# Patient Record
Sex: MALE | Race: WHITE | URBAN - METROPOLITAN AREA
[De-identification: names, ages, dates, MRNs, and addresses within clinical notes are randomized per-mention and may not be internally consistent; named-entity substitution may affect disease eponyms.]

---

## 2022-09-22 ENCOUNTER — OFFICE VISIT (OUTPATIENT)
Dept: URBAN - METROPOLITAN AREA CLINIC 28 | Facility: CLINIC | Age: 67
End: 2022-09-22
Payer: MEDICARE

## 2022-09-22 DIAGNOSIS — H43.811 VITREOUS DETACHMENT OF RIGHT EYE: Primary | ICD-10-CM

## 2022-09-22 DIAGNOSIS — D31.32 BENIGN NEOPLASM OF LEFT CHOROID: ICD-10-CM

## 2022-09-22 DIAGNOSIS — H25.813 COMBINED FORMS OF AGE-RELATED CATARACT, BILATERAL: ICD-10-CM

## 2022-09-22 PROCEDURE — 92004 COMPRE OPH EXAM NEW PT 1/>: CPT | Performed by: OPTOMETRIST

## 2022-09-22 ASSESSMENT — INTRAOCULAR PRESSURE
OD: 14
OS: 14

## 2022-09-22 ASSESSMENT — KERATOMETRY
OS: 43.50
OD: 43.50

## 2022-09-22 NOTE — IMPRESSION/PLAN
Impression: Benign neoplasm of left choroid: D31.32. Plan: Educated on exam findings. Baseline photos today. Recommend annual monitoring.

## 2023-02-21 LAB
CREATININE (MG/DL) IN SER/PLAS: 1 MG/DL (ref 0.5–1.3)
GFR MALE: 82 ML/MIN/1.73M2

## 2023-03-11 LAB
CALCULI COMPOSITION: NORMAL
CALCULI DESCRIPTION: NORMAL
CALCULI MASS: 554 MG

## 2023-04-20 DIAGNOSIS — N20.0 KIDNEY STONES: ICD-10-CM

## 2023-04-20 DIAGNOSIS — R31.9 HEMATURIA, UNSPECIFIED TYPE: ICD-10-CM

## 2023-04-20 DIAGNOSIS — R10.9 FLANK PAIN: Primary | ICD-10-CM

## 2023-04-20 PROBLEM — E66.812 OBESITY, CLASS II, BMI 35-39.9: Status: ACTIVE | Noted: 2023-04-20

## 2023-04-20 PROBLEM — E66.9 OBESITY, CLASS II, BMI 35-39.9: Status: ACTIVE | Noted: 2023-04-20

## 2023-04-20 PROBLEM — E78.5 DYSLIPIDEMIA: Status: ACTIVE | Noted: 2023-04-20

## 2023-04-20 PROBLEM — R03.0 PRE-HYPERTENSION: Status: ACTIVE | Noted: 2023-04-20

## 2023-04-20 PROBLEM — R97.20 ELEVATED PSA: Status: ACTIVE | Noted: 2023-04-20

## 2023-04-20 RX ORDER — BRIMONIDINE TARTRATE AND TIMOLOL MALEATE 2; 5 MG/ML; MG/ML
1 SOLUTION OPHTHALMIC EVERY 12 HOURS SCHEDULED
COMMUNITY
Start: 2017-09-11

## 2023-04-20 RX ORDER — DICLOFENAC SODIUM 1 MG/ML
SOLUTION/ DROPS OPHTHALMIC
COMMUNITY
Start: 2023-02-09 | End: 2023-05-01 | Stop reason: ALTCHOICE

## 2023-04-21 ENCOUNTER — LAB (OUTPATIENT)
Dept: LAB | Facility: LAB | Age: 68
End: 2023-04-21
Payer: MEDICARE

## 2023-04-21 DIAGNOSIS — R10.9 FLANK PAIN: ICD-10-CM

## 2023-04-21 LAB
APPEARANCE, URINE: ABNORMAL
BILIRUBIN, URINE: NEGATIVE
BLOOD, URINE: ABNORMAL
COLOR, URINE: YELLOW
GLUCOSE, URINE: NEGATIVE MG/DL
KETONES, URINE: NEGATIVE MG/DL
LEUKOCYTE ESTERASE, URINE: NEGATIVE
NITRITE, URINE: NEGATIVE
PH, URINE: 5 (ref 5–8)
PROTEIN, URINE: NEGATIVE MG/DL
RBC, URINE: NORMAL /HPF (ref 0–5)
SPECIFIC GRAVITY, URINE: 1.02 (ref 1–1.03)
UROBILINOGEN, URINE: <2 MG/DL (ref 0–1.9)
WBC, URINE: NORMAL /HPF (ref 0–5)

## 2023-04-21 PROCEDURE — 81001 URINALYSIS AUTO W/SCOPE: CPT

## 2023-04-25 ENCOUNTER — LAB (OUTPATIENT)
Dept: LAB | Facility: LAB | Age: 68
End: 2023-04-25
Payer: MEDICARE

## 2023-04-25 DIAGNOSIS — R31.9 HEMATURIA, UNSPECIFIED TYPE: ICD-10-CM

## 2023-04-25 DIAGNOSIS — N20.0 KIDNEY STONES: ICD-10-CM

## 2023-04-25 DIAGNOSIS — R10.9 FLANK PAIN: ICD-10-CM

## 2023-04-25 PROCEDURE — 36415 COLL VENOUS BLD VENIPUNCTURE: CPT

## 2023-04-25 PROCEDURE — 82565 ASSAY OF CREATININE: CPT

## 2023-04-26 ENCOUNTER — OFFICE VISIT (OUTPATIENT)
Dept: PRIMARY CARE | Facility: CLINIC | Age: 68
End: 2023-04-26
Payer: MEDICARE

## 2023-04-26 VITALS
SYSTOLIC BLOOD PRESSURE: 137 MMHG | HEART RATE: 54 BPM | WEIGHT: 272.3 LBS | OXYGEN SATURATION: 96 % | RESPIRATION RATE: 12 BRPM | BODY MASS INDEX: 34.49 KG/M2 | DIASTOLIC BLOOD PRESSURE: 81 MMHG | TEMPERATURE: 98.7 F

## 2023-04-26 DIAGNOSIS — E78.2 MIXED HYPERLIPIDEMIA: ICD-10-CM

## 2023-04-26 DIAGNOSIS — E66.9 OBESITY, CLASS II, BMI 35-39.9: ICD-10-CM

## 2023-04-26 DIAGNOSIS — R97.20 ELEVATED PSA: ICD-10-CM

## 2023-04-26 DIAGNOSIS — N40.0 BENIGN PROSTATIC HYPERPLASIA, UNSPECIFIED WHETHER LOWER URINARY TRACT SYMPTOMS PRESENT: ICD-10-CM

## 2023-04-26 DIAGNOSIS — E78.5 DYSLIPIDEMIA: ICD-10-CM

## 2023-04-26 DIAGNOSIS — N20.0 KIDNEY STONES: Primary | ICD-10-CM

## 2023-04-26 DIAGNOSIS — R31.29 OTHER MICROSCOPIC HEMATURIA: ICD-10-CM

## 2023-04-26 PROBLEM — R03.0 PRE-HYPERTENSION: Status: RESOLVED | Noted: 2023-04-20 | Resolved: 2023-04-26

## 2023-04-26 LAB
CREATININE (MG/DL) IN SER/PLAS: 0.94 MG/DL (ref 0.5–1.3)
GFR MALE: 88 ML/MIN/1.73M2

## 2023-04-26 PROCEDURE — 1036F TOBACCO NON-USER: CPT | Performed by: FAMILY MEDICINE

## 2023-04-26 PROCEDURE — 1160F RVW MEDS BY RX/DR IN RCRD: CPT | Performed by: FAMILY MEDICINE

## 2023-04-26 PROCEDURE — 99214 OFFICE O/P EST MOD 30 MIN: CPT | Performed by: FAMILY MEDICINE

## 2023-04-26 PROCEDURE — 1159F MED LIST DOCD IN RCRD: CPT | Performed by: FAMILY MEDICINE

## 2023-04-26 NOTE — ASSESSMENT & PLAN NOTE
CT scan 4/25/23 showed 1-2 mm calculus in left posterolateral bladder.  Explains the recent finding of hematuria.  No treatment needed now that it is passed into the bladder.

## 2023-04-26 NOTE — ASSESSMENT & PLAN NOTE
Recent CT calcium scoring test showed low plaque build up in arteries.  This indicates low risk for heart attack or stroke, so medication treatment is not recommended at this time.  Will continue to monitor cholesterol levels.   Ordered lipid panel prior to next appt.  Continue lifestyle measures to control cholesterol.

## 2023-04-26 NOTE — ASSESSMENT & PLAN NOTE
Body mass index is 34.49 kg/m².  Ideal weight is BMI 25 or under.  Think of healthy lifestyle changes rather than a diet  Weight loss is 75% diet and 25% exercise   Think of daily plate that includes 50% vegetables and do not count peas, corn, white potatoes as a vegetable. 25% complex grains/starch, 25% protein/fat.  Ideal diet is predominately plant based - Vegetables and Fruit. Protein from non meat sources ideal (whole beans, chickpeas). If choosing meat source for protein - first choice is fish in omega-3 such as [place]. Next choice is skinless poultry and last choice and infrequent should be red meat.  Weight loss can be helped through mindful eating. There are apps that can be used to assist with keeping track of your food water and exercise, such as My fitness pal Can see nutritionist if preferred.   Losing 4-6 lbs a month is a sufficient means of gradually losing and maintaining weight loss (good healthy, long term weight loss).

## 2023-04-26 NOTE — ASSESSMENT & PLAN NOTE
Seen by urologist Dr. Hernandez.  Determined to be due to benign aging prostate.  Ordered repeat PSA to be completed before next appt.

## 2023-04-26 NOTE — PROGRESS NOTES
Subjective     Patient ID: Denis Robertson is a 68 y.o. male who presents for follow up of chronic medical conditions.    Tdap:  Colonoscopy: 03/26/2018      HPI  Recent CT scan from yesterday showed small 1-2 mm calculus in left posterolateral bladder. He still has left flank pain intermittently. It was severe 2 days ago when he woke up, but has improved since then.     Patient reports that urine flow has improved since starting Prostagenix. Bladder emptying test per Dr. Hernandez was normal.    Patient reports that he checked home BP and it was initially reading 169/109. After rechecking it, the reading went down to 139/84. Patient has been swimming regularly for exercise.    Review of Systems  constitutional: as per HPI  eyes:as per HPI  CV:as per HPI  Respiratory:as per HPI  GI:as per HPI  neuro:as per HPI  endo:as per HPI  heme/lymph:as per HPI     Objective   /81 (BP Location: Right arm, Patient Position: Sitting, BP Cuff Size: Large adult)   Pulse 54   Temp 37.1 °C (98.7 °F) (Temporal)   Resp 12   Wt 124 kg (272 lb 4.8 oz)   SpO2 96%   BMI 34.49 kg/m²   Physical Exam  Constitutional:       Appearance: Normal appearance. He is obese.   Cardiovascular:      Rate and Rhythm: Normal rate and regular rhythm.   Pulmonary:      Effort: Pulmonary effort is normal.      Breath sounds: Normal breath sounds.         Assessment/Plan   Problem List Items Addressed This Visit          Genitourinary    Elevated PSA     Seen by urologist Dr. Hernandez.  Determined to be due to benign aging prostate.  Ordered repeat PSA to be completed before next appt.         Other microscopic hematuria     Secondary to kidney stone that has passed into the left side of the bladder.         Kidney stones - Primary     CT scan 4/25/23 showed 1-2 mm calculus in left posterolateral bladder.  Explains the recent finding of hematuria.  No treatment needed now that it is passed into the bladder.            Endocrine/Metabolic    Obesity, Class II,  BMI 35-39.9     Body mass index is 34.49 kg/m².  Ideal weight is BMI 25 or under.  Think of healthy lifestyle changes rather than a diet  Weight loss is 75% diet and 25% exercise   Think of daily plate that includes 50% vegetables and do not count peas, corn, white potatoes as a vegetable. 25% complex grains/starch, 25% protein/fat.  Ideal diet is predominately plant based - Vegetables and Fruit. Protein from non meat sources ideal (whole beans, chickpeas). If choosing meat source for protein - first choice is fish in omega-3 such as [place]. Next choice is skinless poultry and last choice and infrequent should be red meat.  Weight loss can be helped through mindful eating. There are apps that can be used to assist with keeping track of your food water and exercise, such as My fitness pal Can see nutritionist if preferred.   Losing 4-6 lbs a month is a sufficient means of gradually losing and maintaining weight loss (good healthy, long term weight loss).             Other    Dyslipidemia     Recent CT calcium scoring test showed low plaque build up in arteries.  This indicates low risk for heart attack or stroke, so medication treatment is not recommended at this time.  Will continue to monitor cholesterol levels.   Ordered lipid panel prior to next appt.  Continue lifestyle measures to control cholesterol.          Continue lifestyle measures to control blood pressure.   Encouraged to regularly check blood pressure at home.  Home blood pressure readings should be taken after seated for 5 minutes, feet on the floor, back supported, arm elevated at heart level, and with an empty bladder    Follow up in 6 months.     By signing my name below I, siria Smiley, attest that this documentation has been prepared under the direction and in the presence of Dr. Idris Olivia. 04/26/23

## 2023-10-25 ENCOUNTER — LAB (OUTPATIENT)
Dept: LAB | Facility: LAB | Age: 68
End: 2023-10-25
Payer: MEDICARE

## 2023-10-25 DIAGNOSIS — N20.0 KIDNEY STONES: ICD-10-CM

## 2023-10-25 DIAGNOSIS — N40.0 BENIGN PROSTATIC HYPERPLASIA, UNSPECIFIED WHETHER LOWER URINARY TRACT SYMPTOMS PRESENT: ICD-10-CM

## 2023-10-25 DIAGNOSIS — E78.2 MIXED HYPERLIPIDEMIA: ICD-10-CM

## 2023-10-25 LAB
ALBUMIN SERPL BCP-MCNC: 4.4 G/DL (ref 3.4–5)
ALP SERPL-CCNC: 92 U/L (ref 33–136)
ALT SERPL W P-5'-P-CCNC: 18 U/L (ref 10–52)
ANION GAP SERPL CALC-SCNC: 12 MMOL/L (ref 10–20)
AST SERPL W P-5'-P-CCNC: 16 U/L (ref 9–39)
BASOPHILS # BLD AUTO: 0.04 X10*3/UL (ref 0–0.1)
BASOPHILS NFR BLD AUTO: 0.6 %
BILIRUB SERPL-MCNC: 0.7 MG/DL (ref 0–1.2)
BUN SERPL-MCNC: 20 MG/DL (ref 6–23)
CALCIUM SERPL-MCNC: 9.6 MG/DL (ref 8.6–10.6)
CHLORIDE SERPL-SCNC: 103 MMOL/L (ref 98–107)
CHOLEST SERPL-MCNC: 198 MG/DL (ref 0–199)
CHOLESTEROL/HDL RATIO: 5.4
CO2 SERPL-SCNC: 28 MMOL/L (ref 21–32)
CREAT SERPL-MCNC: 1.04 MG/DL (ref 0.5–1.3)
EOSINOPHIL # BLD AUTO: 0.13 X10*3/UL (ref 0–0.7)
EOSINOPHIL NFR BLD AUTO: 2 %
ERYTHROCYTE [DISTWIDTH] IN BLOOD BY AUTOMATED COUNT: 12.9 % (ref 11.5–14.5)
GFR SERPL CREATININE-BSD FRML MDRD: 78 ML/MIN/1.73M*2
GLUCOSE SERPL-MCNC: 88 MG/DL (ref 74–99)
HCT VFR BLD AUTO: 47.9 % (ref 41–52)
HDLC SERPL-MCNC: 36.8 MG/DL
HGB BLD-MCNC: 15.9 G/DL (ref 13.5–17.5)
IMM GRANULOCYTES # BLD AUTO: 0.01 X10*3/UL (ref 0–0.7)
IMM GRANULOCYTES NFR BLD AUTO: 0.2 % (ref 0–0.9)
LDLC SERPL CALC-MCNC: 112 MG/DL
LYMPHOCYTES # BLD AUTO: 1.47 X10*3/UL (ref 1.2–4.8)
LYMPHOCYTES NFR BLD AUTO: 22.1 %
MCH RBC QN AUTO: 30.5 PG (ref 26–34)
MCHC RBC AUTO-ENTMCNC: 33.2 G/DL (ref 32–36)
MCV RBC AUTO: 92 FL (ref 80–100)
MONOCYTES # BLD AUTO: 0.64 X10*3/UL (ref 0.1–1)
MONOCYTES NFR BLD AUTO: 9.6 %
NEUTROPHILS # BLD AUTO: 4.37 X10*3/UL (ref 1.2–7.7)
NEUTROPHILS NFR BLD AUTO: 65.5 %
NON HDL CHOLESTEROL: 161 MG/DL (ref 0–149)
NRBC BLD-RTO: 0 /100 WBCS (ref 0–0)
PLATELET # BLD AUTO: 188 X10*3/UL (ref 150–450)
PMV BLD AUTO: 11.3 FL (ref 7.5–11.5)
POTASSIUM SERPL-SCNC: 4.2 MMOL/L (ref 3.5–5.3)
PROT SERPL-MCNC: 6.8 G/DL (ref 6.4–8.2)
PSA SERPL-MCNC: 6.77 NG/ML
RBC # BLD AUTO: 5.21 X10*6/UL (ref 4.5–5.9)
SODIUM SERPL-SCNC: 139 MMOL/L (ref 136–145)
TRIGL SERPL-MCNC: 245 MG/DL (ref 0–149)
URATE SERPL-MCNC: 7 MG/DL (ref 4–7.5)
VLDL: 49 MG/DL (ref 0–40)
WBC # BLD AUTO: 6.7 X10*3/UL (ref 4.4–11.3)

## 2023-10-25 PROCEDURE — 84550 ASSAY OF BLOOD/URIC ACID: CPT

## 2023-10-25 PROCEDURE — 80053 COMPREHEN METABOLIC PANEL: CPT

## 2023-10-25 PROCEDURE — 85025 COMPLETE CBC W/AUTO DIFF WBC: CPT

## 2023-10-25 PROCEDURE — 84153 ASSAY OF PSA TOTAL: CPT

## 2023-10-25 PROCEDURE — 36415 COLL VENOUS BLD VENIPUNCTURE: CPT

## 2023-10-25 PROCEDURE — 80061 LIPID PANEL: CPT

## 2023-11-01 ENCOUNTER — APPOINTMENT (OUTPATIENT)
Dept: PRIMARY CARE | Facility: CLINIC | Age: 68
End: 2023-11-01
Payer: MEDICARE

## 2023-12-13 ENCOUNTER — OFFICE VISIT (OUTPATIENT)
Dept: PRIMARY CARE | Facility: CLINIC | Age: 68
End: 2023-12-13
Payer: MEDICARE

## 2023-12-13 VITALS
HEART RATE: 56 BPM | HEIGHT: 75 IN | RESPIRATION RATE: 16 BRPM | TEMPERATURE: 97.7 F | SYSTOLIC BLOOD PRESSURE: 165 MMHG | OXYGEN SATURATION: 97 % | DIASTOLIC BLOOD PRESSURE: 83 MMHG | WEIGHT: 267.6 LBS | BODY MASS INDEX: 33.27 KG/M2

## 2023-12-13 DIAGNOSIS — R97.20 ELEVATED PSA: ICD-10-CM

## 2023-12-13 DIAGNOSIS — Z28.21 INFLUENZA VACCINE REFUSED: ICD-10-CM

## 2023-12-13 DIAGNOSIS — E66.9 OBESITY, CLASS II, BMI 35-39.9: ICD-10-CM

## 2023-12-13 DIAGNOSIS — Z13.89 SCREENING FOR MULTIPLE CONDITIONS: ICD-10-CM

## 2023-12-13 DIAGNOSIS — Z11.59 NEED FOR HEPATITIS C SCREENING TEST: ICD-10-CM

## 2023-12-13 DIAGNOSIS — Z28.21 PNEUMOCOCCAL VACCINE REFUSED: ICD-10-CM

## 2023-12-13 DIAGNOSIS — Z00.00 ROUTINE GENERAL MEDICAL EXAMINATION AT HEALTH CARE FACILITY: Primary | ICD-10-CM

## 2023-12-13 DIAGNOSIS — Z28.21 REFUSED PNEUMOCOCCAL VACCINATION: ICD-10-CM

## 2023-12-13 DIAGNOSIS — Z00.00 MEDICARE ANNUAL WELLNESS VISIT, SUBSEQUENT: ICD-10-CM

## 2023-12-13 PROCEDURE — 1160F RVW MEDS BY RX/DR IN RCRD: CPT | Performed by: FAMILY MEDICINE

## 2023-12-13 PROCEDURE — G0444 DEPRESSION SCREEN ANNUAL: HCPCS | Performed by: FAMILY MEDICINE

## 2023-12-13 PROCEDURE — G0439 PPPS, SUBSEQ VISIT: HCPCS | Performed by: FAMILY MEDICINE

## 2023-12-13 PROCEDURE — 1159F MED LIST DOCD IN RCRD: CPT | Performed by: FAMILY MEDICINE

## 2023-12-13 PROCEDURE — 1170F FXNL STATUS ASSESSED: CPT | Performed by: FAMILY MEDICINE

## 2023-12-13 PROCEDURE — 1036F TOBACCO NON-USER: CPT | Performed by: FAMILY MEDICINE

## 2023-12-13 ASSESSMENT — ENCOUNTER SYMPTOMS
UNEXPECTED WEIGHT CHANGE: 0
FREQUENCY: 0

## 2023-12-13 ASSESSMENT — PATIENT HEALTH QUESTIONNAIRE - PHQ9
1. LITTLE INTEREST OR PLEASURE IN DOING THINGS: NOT AT ALL
SUM OF ALL RESPONSES TO PHQ9 QUESTIONS 1 AND 2: 0
2. FEELING DOWN, DEPRESSED OR HOPELESS: NOT AT ALL

## 2023-12-13 ASSESSMENT — ACTIVITIES OF DAILY LIVING (ADL)
BATHING: INDEPENDENT
DRESSING: INDEPENDENT
GROCERY_SHOPPING: INDEPENDENT
TAKING_MEDICATION: INDEPENDENT
MANAGING_FINANCES: INDEPENDENT
DOING_HOUSEWORK: INDEPENDENT

## 2023-12-13 NOTE — ASSESSMENT & PLAN NOTE
Congratulations on your weight loss so far!  Body mass index is 33.9 kg/m².  Ideal weight is BMI 25 or under.  Think of healthy lifestyle changes rather than a diet  Weight loss is 75% diet and 25% exercise   Think of daily plate that includes 50% vegetables and do not count peas, corn, white potatoes as a vegetable. 25% complex grains/starch, 25% protein/fat.  Ideal diet is predominately plant based - Vegetables and Fruit. Protein from non meat sources ideal (whole beans, chickpeas). If choosing meat source for protein - first choice is fish in omega-3 such as [place]. Next choice is skinless poultry and last choice and infrequent should be red meat.  Weight loss can be helped through mindful eating. There are apps that can be used to assist with keeping track of your food water and exercise, such as My fitness pal Can see nutritionist if preferred.   Losing 4-6 lbs a month is a sufficient means of gradually losing and maintaining weight loss (good healthy, long term weight loss).

## 2023-12-13 NOTE — PROGRESS NOTES
"Subjective   Reason for Visit: Trevor Robertson is an 68 y.o. male here for a Medicare Wellness visit.               He has been feeling well overall. He has lost about 10 pounds recently and is happy with this progress. He does not check his blood pressure at home, mainly because he does not trust the equipment that he has.  He had an MRI in February of 2023. It shows no cancer. On 2/24/2023 his PSI was 8.1. He still does get up about once a night to use the bathroom. He is able to hold his bladder during the day, no leakage or accidents. He is taking Prostagenix since about February 2023.   His last colonoscopy was in 2021, he is not due to repeat until 2026. He had one benign polyp.   He declines the flu and pneumonia vaccines.        Patient Care Team:  Idris Olivia MD as PCP - General  Idris Olivia MD as PCP - [facility] Attributed Provider     Review of Systems   Constitutional:  Negative for unexpected weight change (Working on weight loss).   Genitourinary:  Negative for frequency and urgency.       Objective   Vitals:  /83 (BP Location: Left arm, Patient Position: Sitting, BP Cuff Size: Large adult)   Pulse 56   Temp 36.5 °C (97.7 °F)   Resp 16   Ht 1.892 m (6' 2.5\")   Wt 121 kg (267 lb 9.6 oz)   SpO2 97%   BMI 33.90 kg/m²         Physical Exam  Constitutional:       Appearance: Normal appearance. He is obese.   Cardiovascular:      Rate and Rhythm: Normal rate and regular rhythm.      Pulses: Normal pulses.      Heart sounds: Normal heart sounds.   Pulmonary:      Effort: Pulmonary effort is normal.      Breath sounds: Normal breath sounds.   Neurological:      General: No focal deficit present.      Mental Status: He is alert and oriented to person, place, and time. Mental status is at baseline.   Psychiatric:         Mood and Affect: Mood normal.         Behavior: Behavior normal.         Thought Content: Thought content normal.         Judgment: Judgment normal.         Assessment/Plan " "  Problem List Items Addressed This Visit       Elevated PSA    Current Assessment & Plan     Seen by urologist Dr. Hernandez.  2/24/2023 PSI 8.1         Obesity, Class II, BMI 35-39.9    Current Assessment & Plan     Congratulations on your weight loss so far!  Body mass index is 33.9 kg/m².  Ideal weight is BMI 25 or under.  Think of healthy lifestyle changes rather than a diet  Weight loss is 75% diet and 25% exercise   Think of daily plate that includes 50% vegetables and do not count peas, corn, white potatoes as a vegetable. 25% complex grains/starch, 25% protein/fat.  Ideal diet is predominately plant based - Vegetables and Fruit. Protein from non meat sources ideal (whole beans, chickpeas). If choosing meat source for protein - first choice is fish in omega-3 such as [place]. Next choice is skinless poultry and last choice and infrequent should be red meat.  Weight loss can be helped through mindful eating. There are apps that can be used to assist with keeping track of your food water and exercise, such as My fitness pal Can see nutritionist if preferred.   Losing 4-6 lbs a month is a sufficient means of gradually losing and maintaining weight loss (good healthy, long term weight loss).          RESOLVED: Refused pneumococcal vaccination    Medicare annual wellness visit, subsequent    Current Assessment & Plan     Tips for your general wellness...;  Remember importance of daily aerobic exercise for 30minutes to help with both your physical and mental/emotional health.  ;  Take time twice a day to relax with focused breathing and relaxation.  A good source to learn \"mindfulness\" relaxation is a book \"Mindfulness for Beginners\" by Robin Velazquez.  ;    Strive for healthy eating with plenty of water, fruits, vegetables, and choosing as much plant based diet as able  If do consume non plant protein, choose fish high in omega (like salmon or cod), skinless poultry, and rarely anything from a cow  Avoid processed " foods.  ;  Shop the perimeter of the grocery store  - not the inner aisles where all the boxed, bagged, and canned process non natural foods are   Avoid sugar - including fruit juices with added sugar and avoid artificial sweeteners (sucralose, aspartame).; if want to use sweetener, use stevia (natural plant based non caloric sweetener)  Recommended guided nutrition plans include Mediterranean Diet - online resources available     Get plenty of sleep nightly - 7 hours minimum;    Exercise 150min per week;    Do not use tobacco    Abstain or limit alcohol to 1-2 drinks per 24 hours    See your dentist at least yearly    Have an eye check at least every 5 years    Follow up 1 year for annual wellness checkup;          Influenza vaccine refused    Pneumococcal vaccine refused     Other Visit Diagnoses       Routine general medical examination at health care facility    -  Primary    Screening for multiple conditions        Need for hepatitis C screening test        Relevant Orders    Hepatitis C antibody             Scribe Attestation  By signing my name below, IKathleen , Mariano   attest that this documentation has been prepared under the direction and in the presence of Idris Olivia MD.

## 2023-12-13 NOTE — PROGRESS NOTES
Subjective   Patient ID: Trevor Robertson is a 68 year old male here for a Medicare Wellness visit.    HPI    Review of Systems    Objective   Vitals:    12/13/23 1454   BP: 165/83   Pulse: 56   Resp: 16   Temp: 36.5 °C (97.7 °F)   SpO2: 97%         Physical Exam      Problem List Items Addressed This Visit          Health Encounters    Medicare annual wellness visit, subsequent     Other Visit Diagnoses       Routine general medical examination at health care facility    -  Primary    Screening for multiple conditions        Need for hepatitis C screening test        Relevant Orders    Hepatitis C antibody          Scribe Attestation  By signing my name below, IKathleen Scribe   attest that this documentation has been prepared under the direction and in the presence of Idris Olivia MD.

## 2023-12-19 PROBLEM — Z28.21 PNEUMOCOCCAL VACCINE REFUSED: Status: ACTIVE | Noted: 2023-12-19

## 2023-12-19 PROBLEM — Z28.21 REFUSED PNEUMOCOCCAL VACCINATION: Status: RESOLVED | Noted: 2023-12-13 | Resolved: 2023-12-19

## 2023-12-19 PROBLEM — Z28.21 INFLUENZA VACCINE REFUSED: Status: ACTIVE | Noted: 2023-12-19

## 2023-12-19 NOTE — ASSESSMENT & PLAN NOTE
"Tips for your general wellness...;  Remember importance of daily aerobic exercise for 30minutes to help with both your physical and mental/emotional health.  ;  Take time twice a day to relax with focused breathing and relaxation.  A good source to learn \"mindfulness\" relaxation is a book \"Mindfulness for Beginners\" by Robin Velazquez.  ;    Strive for healthy eating with plenty of water, fruits, vegetables, and choosing as much plant based diet as able  If do consume non plant protein, choose fish high in omega (like salmon or cod), skinless poultry, and rarely anything from a cow  Avoid processed foods.  ;  Shop the perimeter of the grocery store  - not the inner aisles where all the boxed, bagged, and canned process non natural foods are   Avoid sugar - including fruit juices with added sugar and avoid artificial sweeteners (sucralose, aspartame).; if want to use sweetener, use stevia (natural plant based non caloric sweetener)  Recommended guided nutrition plans include Mediterranean Diet - online resources available     Get plenty of sleep nightly - 7 hours minimum;    Exercise 150min per week;    Do not use tobacco    Abstain or limit alcohol to 1-2 drinks per 24 hours    See your dentist at least yearly    Have an eye check at least every 5 years    Follow up 1 year for annual wellness checkup;   "

## 2024-06-05 ENCOUNTER — APPOINTMENT (OUTPATIENT)
Dept: PRIMARY CARE | Facility: CLINIC | Age: 69
End: 2024-06-05
Payer: MEDICARE

## 2024-06-19 ENCOUNTER — APPOINTMENT (OUTPATIENT)
Dept: PRIMARY CARE | Facility: CLINIC | Age: 69
End: 2024-06-19
Payer: MEDICARE

## 2024-08-14 DIAGNOSIS — R31.29 OTHER MICROSCOPIC HEMATURIA: ICD-10-CM

## 2024-08-14 DIAGNOSIS — Z00.00 WELLNESS EXAMINATION: Primary | ICD-10-CM

## 2024-08-14 DIAGNOSIS — R97.20 ELEVATED PSA: ICD-10-CM

## 2024-09-04 ENCOUNTER — APPOINTMENT (OUTPATIENT)
Dept: PRIMARY CARE | Facility: CLINIC | Age: 69
End: 2024-09-04
Payer: MEDICARE

## 2024-09-18 ENCOUNTER — HOSPITAL ENCOUNTER (OUTPATIENT)
Dept: RADIOLOGY | Facility: CLINIC | Age: 69
Discharge: HOME | End: 2024-09-18
Payer: MEDICARE

## 2024-09-18 DIAGNOSIS — N20.0 CALCULUS OF KIDNEY: ICD-10-CM

## 2024-09-18 PROCEDURE — 76770 US EXAM ABDO BACK WALL COMP: CPT

## 2024-09-18 PROCEDURE — 76770 US EXAM ABDO BACK WALL COMP: CPT | Performed by: STUDENT IN AN ORGANIZED HEALTH CARE EDUCATION/TRAINING PROGRAM

## 2024-09-25 ENCOUNTER — LAB (OUTPATIENT)
Dept: LAB | Facility: LAB | Age: 69
End: 2024-09-25
Payer: MEDICARE

## 2024-09-25 DIAGNOSIS — R31.29 OTHER MICROSCOPIC HEMATURIA: ICD-10-CM

## 2024-09-25 DIAGNOSIS — R97.20 ELEVATED PSA: ICD-10-CM

## 2024-09-25 LAB
ERYTHROCYTE [DISTWIDTH] IN BLOOD BY AUTOMATED COUNT: 12.8 % (ref 11.5–14.5)
HCT VFR BLD AUTO: 46.7 % (ref 41–52)
HGB BLD-MCNC: 15.8 G/DL (ref 13.5–17.5)
MCH RBC QN AUTO: 30.7 PG (ref 26–34)
MCHC RBC AUTO-ENTMCNC: 33.8 G/DL (ref 32–36)
MCV RBC AUTO: 91 FL (ref 80–100)
NRBC BLD-RTO: 0 /100 WBCS (ref 0–0)
PLATELET # BLD AUTO: 169 X10*3/UL (ref 150–450)
PSA SERPL-MCNC: 6.6 NG/ML
RBC # BLD AUTO: 5.15 X10*6/UL (ref 4.5–5.9)
WBC # BLD AUTO: 6.6 X10*3/UL (ref 4.4–11.3)

## 2024-09-25 PROCEDURE — 36415 COLL VENOUS BLD VENIPUNCTURE: CPT

## 2024-09-25 PROCEDURE — 85027 COMPLETE CBC AUTOMATED: CPT

## 2024-09-25 PROCEDURE — 84153 ASSAY OF PSA TOTAL: CPT

## 2024-09-25 NOTE — PROGRESS NOTES
HPI    69 y.o. male being seen with the following problem list:    Problem list:  Elevated PSA  Hematuria    History of elevated PSA and hematuria. Had an episode of painless gross hematuria last week that has since cleared up, thinks he may have passed a stone, did not see a stone pass, and denies flank pain. Has had this happen before but usually sees a stone pass. Most recent was about a year and half ago. No prior surgery for stones. No urinary issues. Erections work well.      Denies any family history of PCa. No prior biopsy.      Seen 1/17/23 , plan for prostate MRI, CTU, cyto, cysto     MR prostate 2/2023 - 137g gland, no concerning lesions. PSAd 6%     CTU 2/2023 - nonobstructive stones bilaterally, enlarged prostate, bladder stone  Cyto 2/2023 - neg     3/6/23 - here for cysto. no more hematuria. LUTS aren't too bad.      CT A&P w con. 4/26/23: Personally reviewed and interpreted.   1. Bilateral nonobstructive nephrolithiasis. Mild pelvocaliectasis. There  is a 1-2 mm calculus within the left posterolateral aspect of the  urinary bladder. Unclear if this is completely passed into the bladder  or potentially partially retained within the left UVJ.  2. Questionable wall thickening of the descending and rectosigmoid colon  although this could be artifactual related to bowel decompression.  Nonspecific colitis not excluded.  3. Cholelithiasis without CT evidence of acute cholecystitis.  4. Prostate enlargement.     9/13/23 - seen today for 6mo PVR. PVR 7cc. Patient is doing well. No further complaints of flank pain due to stones. No urinary complaints at all today. erections working well.     PSA trend:   9/2024 - 6.6  10/2023 - 6.7  10/2022 - 8.1 (16% free)  10/2021 - 6.1  6/2021 - 4.7    9/13/24 renal US: Large prostate, no hydro or stones.     09/27/24 - PVR 0cc. Denies any flank pain, no voiding issues or concerns. Does not take any medication for urination, only supplements. Erections work well.       Lab  Results   Component Value Date    PSA 6.60 (H) 09/25/2024    PSA 6.77 (H) 10/25/2023    PSA 8.1 (H) 10/24/2022    PSA 6.10 (H) 10/27/2021         Current Medications:  Current Outpatient Medications   Medication Sig Dispense Refill    brimonidine-timoloL (Combigan) 0.2-0.5 % ophthalmic solution Administer 1 drop into both eyes every 12 hours.       No current facility-administered medications for this visit.        Active Problems:  Denis Robertson is a 69 y.o. male with the following Problems and Medications.  Patient Active Problem List   Diagnosis    Dyslipidemia    Elevated PSA    Other microscopic hematuria    Kidney stones    Obesity, Class II, BMI 35-39.9    Medicare annual wellness visit, subsequent    Influenza vaccine refused    Pneumococcal vaccine refused     Current Outpatient Medications   Medication Sig Dispense Refill    brimonidine-timoloL (Combigan) 0.2-0.5 % ophthalmic solution Administer 1 drop into both eyes every 12 hours.       No current facility-administered medications for this visit.       PMH:  Past Medical History:   Diagnosis Date    Disorder of kidney and ureter, unspecified 01/31/2018    Kidney lesion, native, left    Elevated blood-pressure reading, without diagnosis of hypertension 04/27/2022    Pre-hypertension    Encounter for screening for lipoid disorders 09/25/2019    Screening for cholesterol level    Encounter for screening for malignant neoplasm of colon 11/30/2016    Encounter for screening colonoscopy    Left lower quadrant pain 10/25/2016    Left lower quadrant pain    Low back pain, unspecified 01/24/2018    Left low back pain    Lymphedema, not elsewhere classified 10/04/2019    Lymphedema of both lower extremities    Personal history of other diseases of the digestive system 01/31/2018    History of left inguinal hernia    Personal history of other diseases of urinary system 01/24/2018    History of hematuria    Personal history of other diseases of urinary system  11/18/2016    History of hematuria    Personal history of other specified conditions 09/25/2019    History of weight gain    Personal history of other specified conditions 01/31/2018    History of left flank pain    Right upper quadrant pain 10/25/2016    Right upper quadrant abdominal pain       PSH:  Past Surgical History:   Procedure Laterality Date    OTHER SURGICAL HISTORY  09/23/2016    Oral Surgery       FMH:  Family History   Problem Relation Name Age of Onset    Heart murmur Mother      No Known Problems Father         SHx:  Social History     Tobacco Use    Smoking status: Never    Smokeless tobacco: Never   Substance Use Topics    Alcohol use: Not Currently    Drug use: Never       Allergies:  Allergies   Allergen Reactions    Penicillins Unknown       Assessment/Plan  Reviewed and discussed his renal US 9/13, neg for stones or hydro. He is doing well, denies any flank pain or voiding concerns. Does not take any medication, only supplements. We will continue to monitor conservatively   Discussed his PSA, stable and reassuring.     Will follow up in 1 year with PVR and PSA prior.         Scribe Attestation  By signing my name below, I, Mariano Cruz, attest that this documentation  has been prepared under the direction and in the presence of Amilcar Hernandez MD.

## 2024-09-27 ENCOUNTER — OFFICE VISIT (OUTPATIENT)
Dept: UROLOGY | Facility: HOSPITAL | Age: 69
End: 2024-09-27
Payer: MEDICARE

## 2024-09-27 DIAGNOSIS — N40.0 BENIGN PROSTATIC HYPERPLASIA, UNSPECIFIED WHETHER LOWER URINARY TRACT SYMPTOMS PRESENT: Primary | ICD-10-CM

## 2024-09-27 DIAGNOSIS — R97.20 ELEVATED PSA: ICD-10-CM

## 2024-09-27 DIAGNOSIS — Z87.442 HISTORY OF KIDNEY STONES: ICD-10-CM

## 2024-09-27 PROCEDURE — 99214 OFFICE O/P EST MOD 30 MIN: CPT | Performed by: UROLOGY

## 2024-09-27 PROCEDURE — 51798 US URINE CAPACITY MEASURE: CPT | Performed by: UROLOGY

## 2024-09-27 PROCEDURE — G2211 COMPLEX E/M VISIT ADD ON: HCPCS | Performed by: UROLOGY

## 2024-10-09 ENCOUNTER — APPOINTMENT (OUTPATIENT)
Dept: PRIMARY CARE | Facility: CLINIC | Age: 69
End: 2024-10-09
Payer: MEDICARE

## 2024-11-15 ENCOUNTER — LAB (OUTPATIENT)
Dept: LAB | Facility: LAB | Age: 69
End: 2024-11-15
Payer: MEDICARE

## 2024-11-15 DIAGNOSIS — Z11.59 NEED FOR HEPATITIS C SCREENING TEST: ICD-10-CM

## 2024-11-15 DIAGNOSIS — Z00.00 WELLNESS EXAMINATION: ICD-10-CM

## 2024-11-15 LAB
ALBUMIN SERPL BCP-MCNC: 4.3 G/DL (ref 3.4–5)
ALP SERPL-CCNC: 77 U/L (ref 33–136)
ALT SERPL W P-5'-P-CCNC: 28 U/L (ref 10–52)
ANION GAP SERPL CALC-SCNC: 13 MMOL/L (ref 10–20)
AST SERPL W P-5'-P-CCNC: 24 U/L (ref 9–39)
BILIRUB SERPL-MCNC: 0.7 MG/DL (ref 0–1.2)
BUN SERPL-MCNC: 17 MG/DL (ref 6–23)
CALCIUM SERPL-MCNC: 9.3 MG/DL (ref 8.6–10.6)
CHLORIDE SERPL-SCNC: 102 MMOL/L (ref 98–107)
CHOLEST SERPL-MCNC: 198 MG/DL (ref 0–199)
CHOLESTEROL/HDL RATIO: 5.7
CO2 SERPL-SCNC: 30 MMOL/L (ref 21–32)
CREAT SERPL-MCNC: 0.94 MG/DL (ref 0.5–1.3)
EGFRCR SERPLBLD CKD-EPI 2021: 88 ML/MIN/1.73M*2
GLUCOSE SERPL-MCNC: 94 MG/DL (ref 74–99)
HCV AB SER QL: NONREACTIVE
HDLC SERPL-MCNC: 34.7 MG/DL
LDLC SERPL CALC-MCNC: 112 MG/DL
NON HDL CHOLESTEROL: 163 MG/DL (ref 0–149)
POTASSIUM SERPL-SCNC: 4.5 MMOL/L (ref 3.5–5.3)
PROT SERPL-MCNC: 6.5 G/DL (ref 6.4–8.2)
SODIUM SERPL-SCNC: 140 MMOL/L (ref 136–145)
TRIGL SERPL-MCNC: 258 MG/DL (ref 0–149)
VLDL: 52 MG/DL (ref 0–40)

## 2024-11-15 PROCEDURE — 36415 COLL VENOUS BLD VENIPUNCTURE: CPT

## 2024-11-15 PROCEDURE — 80053 COMPREHEN METABOLIC PANEL: CPT

## 2024-11-15 PROCEDURE — 80061 LIPID PANEL: CPT

## 2024-11-15 PROCEDURE — G0472 HEP C SCREEN HIGH RISK/OTHER: HCPCS

## 2024-11-20 ENCOUNTER — APPOINTMENT (OUTPATIENT)
Dept: PRIMARY CARE | Facility: CLINIC | Age: 69
End: 2024-11-20
Payer: MEDICARE

## 2024-11-20 ENCOUNTER — LAB (OUTPATIENT)
Dept: LAB | Facility: LAB | Age: 69
End: 2024-11-20
Payer: MEDICARE

## 2024-11-20 VITALS
HEIGHT: 74 IN | HEART RATE: 64 BPM | TEMPERATURE: 97.3 F | SYSTOLIC BLOOD PRESSURE: 131 MMHG | OXYGEN SATURATION: 97 % | BODY MASS INDEX: 35.25 KG/M2 | DIASTOLIC BLOOD PRESSURE: 78 MMHG | RESPIRATION RATE: 14 BRPM | WEIGHT: 274.7 LBS

## 2024-11-20 DIAGNOSIS — E66.812 OBESITY, CLASS II, BMI 35-39.9: ICD-10-CM

## 2024-11-20 DIAGNOSIS — Z28.21 INFLUENZA VACCINE REFUSED: ICD-10-CM

## 2024-11-20 DIAGNOSIS — Z28.21 PNEUMOCOCCAL VACCINE REFUSED: ICD-10-CM

## 2024-11-20 DIAGNOSIS — E78.5 DYSLIPIDEMIA: Primary | ICD-10-CM

## 2024-11-20 DIAGNOSIS — Z87.442 HISTORY OF KIDNEY STONES: ICD-10-CM

## 2024-11-20 DIAGNOSIS — N40.0 BENIGN PROSTATIC HYPERPLASIA, UNSPECIFIED WHETHER LOWER URINARY TRACT SYMPTOMS PRESENT: ICD-10-CM

## 2024-11-20 DIAGNOSIS — R97.20 ELEVATED PSA: ICD-10-CM

## 2024-11-20 DIAGNOSIS — E78.5 DYSLIPIDEMIA: ICD-10-CM

## 2024-11-20 DIAGNOSIS — Z00.00 MEDICARE ANNUAL WELLNESS VISIT, SUBSEQUENT: ICD-10-CM

## 2024-11-20 DIAGNOSIS — R53.83 FATIGUE, UNSPECIFIED TYPE: ICD-10-CM

## 2024-11-20 PROBLEM — N20.0 KIDNEY STONES: Status: RESOLVED | Noted: 2023-04-20 | Resolved: 2024-11-20

## 2024-11-20 PROBLEM — R31.29 OTHER MICROSCOPIC HEMATURIA: Status: RESOLVED | Noted: 2023-04-20 | Resolved: 2024-11-20

## 2024-11-20 PROCEDURE — 83735 ASSAY OF MAGNESIUM: CPT

## 2024-11-20 PROCEDURE — 1159F MED LIST DOCD IN RCRD: CPT | Performed by: FAMILY MEDICINE

## 2024-11-20 PROCEDURE — 3008F BODY MASS INDEX DOCD: CPT | Performed by: FAMILY MEDICINE

## 2024-11-20 PROCEDURE — 82746 ASSAY OF FOLIC ACID SERUM: CPT

## 2024-11-20 PROCEDURE — 99214 OFFICE O/P EST MOD 30 MIN: CPT | Performed by: FAMILY MEDICINE

## 2024-11-20 PROCEDURE — 82306 VITAMIN D 25 HYDROXY: CPT | Mod: WAIVER OF LIABILITY ON FILE

## 2024-11-20 PROCEDURE — 82607 VITAMIN B-12: CPT

## 2024-11-20 ASSESSMENT — ENCOUNTER SYMPTOMS
RESPIRATORY NEGATIVE: 1
UNEXPECTED WEIGHT CHANGE: 1
MUSCULOSKELETAL NEGATIVE: 1
DIARRHEA: 1
NEUROLOGICAL NEGATIVE: 1
PSYCHIATRIC NEGATIVE: 1
CARDIOVASCULAR NEGATIVE: 1

## 2024-11-20 NOTE — PROGRESS NOTES
BP VALIDATION  .bp  Patient has brought in BP machine for validation.  Arm tested: Left  Office Cuff size: Large Adult  Step 1:  A Patient machine:      BP: 132/83   P:58  B Patient machine:      BP: 128/81   P:59  C Office machine:        BP: 116/73   P: 57  D Patient machine:      BP: 127/86   P: 59  E Office machine:        BP:  132/69  P: 57    Step 2-average of B & D=128+965=914/2=127.5  Difference between Step 2 average and C hpbilxq=660.5-116=11.5  If the difference is:11.5  Less than 5mm Hg, machine is validated  Between 6 and 10 Hg, proceed to step 3  Greater than 10mm Hg-replace device and return to office for validation    Step 3 (if applicable)-average of C and E=116+419=199/2=124  Difference between Step 3 average and D reading=127-124=3  If the difference is:3  Less than or equal to 10mm Hg-machine is validated  Greater than 10mm Hg, replace device and return to office for validation    Did Patient bring their bp readings? YES  Patient informed BP cuff/machine is VALID

## 2024-11-20 NOTE — ASSESSMENT & PLAN NOTE
Lab Results   Component Value Date    CHOL 198 11/15/2024    CHOL 198 10/25/2023    CHOL 198 10/26/2022     Lab Results   Component Value Date    HDL 34.7 11/15/2024    HDL 36.8 10/25/2023    HDL 34.5 (A) 10/26/2022     Lab Results   Component Value Date    LDLCALC 112 (H) 11/15/2024    LDLCALC 112 (H) 10/25/2023     Lab Results   Component Value Date    TRIG 258 (H) 11/15/2024    TRIG 245 (H) 10/25/2023    TRIG 222 (H) 10/26/2022     CT cardiac scoring 2/15/23 - Coronary artery calcium score of 27.  Continue healthy dietary and lifestyle changes.

## 2024-11-20 NOTE — ASSESSMENT & PLAN NOTE
Lab Results   Component Value Date    PSA 6.60 (H) 09/25/2024    PSA 6.77 (H) 10/25/2023    PSA 8.1 (H) 10/24/2022   Monitored by urologist Dr. Hernandez.    Summary of issue:  MR prostate 2/2023 - 137g gland, no concerning lesions. PSAd 6%     CTU 2/2023 - nonobstructive stones bilaterally, enlarged prostate, bladder stone  Cyto 2/2023 - neg  9/13/24 renal US: Large prostate, no hydro or stones.      09/27/24 - PVR 0cc. Denies any flank pain, no voiding issues or concerns. Does not take any medication for urination, only supplements. Erections work well.

## 2024-11-20 NOTE — ASSESSMENT & PLAN NOTE
Last 3 BMI readings:  BMI Readings from Last 3 Encounters:   11/20/24 35.27 kg/m²   12/13/23 33.90 kg/m²   04/26/23 34.96 kg/m²   7 ln weight gain since last appt.  Pt trying different diets and dietary choices.  Advised pt to only shop perimeter of grocery stores, avoid inner aisles.  [employer] carmenza - smart phone carmenza that can scan product barcodes and show nutritional facts to him to while giving score from non-healthy to healthy.    Ideal weight is BMI 25 or under.  Think of healthy lifestyle changes rather than a diet  Weight loss is 75% diet and 25% exercise   Think of daily plate that includes 50% vegetables and do not count peas, corn, white potatoes as a vegetable. 25% complex grains/starch, 25% protein/fat.  Ideal diet is predominately plant based - Vegetables and Fruit. Protein from non meat sources ideal (whole beans, chickpeas). If choosing meat source for protein - first choice is fish in omega-3 such as [place]. Next choice is skinless poultry and last choice and infrequent should be red meat.  Weight loss can be helped through mindful eating. There are apps that can be used to assist with keeping track of your food water and exercise, such as My fitness pal Can see nutritionist if preferred.   Losing 4-6 lbs a month is a sufficient means of gradually losing and maintaining weight loss (good healthy, long term weight loss).

## 2024-11-20 NOTE — ASSESSMENT & PLAN NOTE
8/19/2024 Renal US:  IMPRESSION:  1. No nephrolithiasis or hydronephrosis.  2. Prostatomegaly measuring 136 mL.  3. Incidental note made of hepatic steatosis. Advise clinical and  biochemical correlation.

## 2024-11-20 NOTE — ASSESSMENT & PLAN NOTE
9/27/2024 Dr. Hernandez note:  Reviewed and discussed his renal US 9/13, neg for stones or hydro. He is doing well, denies any flank pain or voiding concerns. Does not take any medication, only supplements. We will continue to monitor conservatively   Discussed his PSA, stable and reassuring.      Will follow up in 1 year with PVR and PSA prior.

## 2024-11-20 NOTE — PROGRESS NOTES
"Subjective   Patient ID: Denis Robertson is a 69 y.o. male who presents for Follow-up (6 mo fuv/Discuss vitamins/supplements (see [employer] message 11/09/2024)).    He monitors his blood pressure at home regularly, he did bring the machine and report with him today. This was reviewed and overall looks normal.   He had sent a [employer] message on 11/9 with a lost of supplements and vitamins he is curious about. He would ultimately like to have blood work done to check for any deficiencies in his diet so that he can choose which supplements he needs daily to stabilize all of his levels. He is constantly making changes to his diet and it may have caused the weight gain since his last appointment.   He continues to follow with Dr. Hernandez in urology for his history of kidney stones and elevated PSA. His most recent appointment was 9/27/24 and he will follow up in one year. He is not having to wake up at night to use the bathroom.  He denies most GI symptoms. Very rarely will he have a random episode of diarrhea. His last colonoscopy was in 2018, he is due now.          Review of Systems   Constitutional:  Positive for unexpected weight change (slight gain).   HENT: Negative.     Respiratory: Negative.     Cardiovascular: Negative.    Gastrointestinal:  Positive for diarrhea (occasional).   Genitourinary: Negative.    Musculoskeletal: Negative.    Neurological: Negative.    Psychiatric/Behavioral: Negative.         Objective   /78 (BP Location: Left arm, Patient Position: Sitting, BP Cuff Size: Large adult)   Pulse 64   Temp 36.3 °C (97.3 °F) (Temporal)   Resp 14   Ht 1.88 m (6' 2\")   Wt 125 kg (274 lb 11.2 oz)   SpO2 97%   BMI 35.27 kg/m²     Physical Exam  Constitutional:       Appearance: Normal appearance. He is obese.   HENT:      Head: Normocephalic.   Eyes:      Conjunctiva/sclera: Conjunctivae normal.   Cardiovascular:      Rate and Rhythm: Normal rate and regular rhythm.      Pulses: Normal pulses.      Heart " sounds: Normal heart sounds.   Pulmonary:      Effort: Pulmonary effort is normal.      Breath sounds: Normal breath sounds.   Musculoskeletal:      Cervical back: Normal range of motion.   Neurological:      General: No focal deficit present.      Mental Status: He is alert.   Psychiatric:         Mood and Affect: Mood normal.         Behavior: Behavior normal.         Thought Content: Thought content normal.         Judgment: Judgment normal.         Assessment/Plan   Problem List Items Addressed This Visit       Dyslipidemia - Primary     Lab Results   Component Value Date    CHOL 198 11/15/2024    CHOL 198 10/25/2023    CHOL 198 10/26/2022     Lab Results   Component Value Date    HDL 34.7 11/15/2024    HDL 36.8 10/25/2023    HDL 34.5 (A) 10/26/2022     Lab Results   Component Value Date    LDLCALC 112 (H) 11/15/2024    LDLCALC 112 (H) 10/25/2023     Lab Results   Component Value Date    TRIG 258 (H) 11/15/2024    TRIG 245 (H) 10/25/2023    TRIG 222 (H) 10/26/2022     CT cardiac scoring 2/15/23 - Coronary artery calcium score of 27.  Continue healthy dietary and lifestyle changes.         Relevant Orders    Magnesium (Completed)    Vitamin C    Vitamin B3 (Niacin and Metabolites)    Vitamin E    Vitamin K    Folate (Completed)    Vitamin D 25-Hydroxy,Total (for eval of Vitamin D levels) (Completed)    Follow Up In [facility] - PCP    Elevated PSA     Lab Results   Component Value Date    PSA 6.60 (H) 09/25/2024    PSA 6.77 (H) 10/25/2023    PSA 8.1 (H) 10/24/2022   Monitored by urologist Dr. Hernandez.    Summary of issue:  MR prostate 2/2023 - 137g gland, no concerning lesions. PSAd 6%     CTU 2/2023 - nonobstructive stones bilaterally, enlarged prostate, bladder stone  Cyto 2/2023 - neg  9/13/24 renal US: Large prostate, no hydro or stones.      09/27/24 - PVR 0cc. Denies any flank pain, no voiding issues or concerns. Does not take any medication for urination, only supplements. Erections work well.           Relevant Orders    Folate (Completed)    Vitamin D 25-Hydroxy,Total (for eval of Vitamin D levels) (Completed)    Follow Up In [facility] - PCP    Obesity, Class II, BMI 35-39.9     Last 3 BMI readings:  BMI Readings from Last 3 Encounters:   11/20/24 35.27 kg/m²   12/13/23 33.90 kg/m²   04/26/23 34.96 kg/m²   7 ln weight gain since last appt.  Pt trying different diets and dietary choices.  Advised pt to only shop perimeter of grocery stores, avoid inner aisles.  [employer] carmenza - smart phone carmenza that can scan product barcodes and show nutritional facts to him to while giving score from non-healthy to healthy.    Ideal weight is BMI 25 or under.  Think of healthy lifestyle changes rather than a diet  Weight loss is 75% diet and 25% exercise   Think of daily plate that includes 50% vegetables and do not count peas, corn, white potatoes as a vegetable. 25% complex grains/starch, 25% protein/fat.  Ideal diet is predominately plant based - Vegetables and Fruit. Protein from non meat sources ideal (whole beans, chickpeas). If choosing meat source for protein - first choice is fish in omega-3 such as [place]. Next choice is skinless poultry and last choice and infrequent should be red meat.  Weight loss can be helped through mindful eating. There are apps that can be used to assist with keeping track of your food water and exercise, such as My fitness pal Can see nutritionist if preferred.   Losing 4-6 lbs a month is a sufficient means of gradually losing and maintaining weight loss (good healthy, long term weight loss).          Relevant Orders    Magnesium (Completed)    Vitamin C    Vitamin B3 (Niacin and Metabolites)    Vitamin E    Vitamin K    Folate (Completed)    Vitamin D 25-Hydroxy,Total (for eval of Vitamin D levels) (Completed)    Follow Up In [facility] - PCP    Medicare annual wellness visit, subsequent    Relevant Orders    Follow Up In [facility] - PCP    Influenza vaccine  refused    Pneumococcal vaccine refused    History of kidney stones     8/19/2024 Renal US:  IMPRESSION:  1. No nephrolithiasis or hydronephrosis.  2. Prostatomegaly measuring 136 mL.  3. Incidental note made of hepatic steatosis. Advise clinical and  biochemical correlation.         Relevant Orders    Magnesium (Completed)    Vitamin C    Vitamin B3 (Niacin and Metabolites)    Vitamin E    Vitamin K    Folate (Completed)    Vitamin D 25-Hydroxy,Total (for eval of Vitamin D levels) (Completed)    Follow Up In [facility] - PCP    Benign prostatic hyperplasia     9/27/2024 Dr. Hernandez note:  Reviewed and discussed his renal US 9/13, neg for stones or hydro. He is doing well, denies any flank pain or voiding concerns. Does not take any medication, only supplements. We will continue to monitor conservatively   Discussed his PSA, stable and reassuring.      Will follow up in 1 year with PVR and PSA prior.         Relevant Orders    Vitamin D 25-Hydroxy,Total (for eval of Vitamin D levels) (Completed)    Follow Up In [facility] - PCP     Other Visit Diagnoses       Fatigue, unspecified type        Relevant Orders    Vitamin B12 (Completed)    Zinc, Serum or Plasma          Follow up: Schedule in 6 months    Scribe Attestation  By signing my name below, IKathleen Scribe   attest that this documentation has been prepared under the direction and in the presence of Idris Olivia MD.

## 2024-11-21 LAB
25(OH)D3 SERPL-MCNC: 37 NG/ML (ref 30–100)
FOLATE SERPL-MCNC: 13.5 NG/ML
MAGNESIUM SERPL-MCNC: 2.29 MG/DL (ref 1.6–2.4)
VIT B12 SERPL-MCNC: 215 PG/ML (ref 211–911)

## 2024-11-22 LAB — ZINC SERPL-MCNC: 92.1 UG/DL (ref 60–120)

## 2024-11-24 LAB
A-TOCOPHEROL VIT E SERPL-MCNC: 14.4 MG/L (ref 5.5–18)
BETA+GAMMA TOCOPHEROL SERPL-MCNC: 0.7 MG/L (ref 0–6)
PHYTONADIONE SERPL-MCNC: 0.72 NMOL/L (ref 0.22–4.88)
VIT C SERPL-MCNC: 52 UMOL/L (ref 23–114)

## 2024-12-01 LAB
NIACIN SERPL-MCNC: NORMAL NG/ML
NICOTINAMIDE SERPL-MCNC: 20 NG/ML
NICOTINURATE SERPL-MCNC: NORMAL NG/ML

## 2025-01-06 RX ORDER — LANOLIN ALCOHOL/MO/W.PET/CERES
1000 CREAM (GRAM) TOPICAL DAILY
COMMUNITY

## 2025-05-28 ENCOUNTER — APPOINTMENT (OUTPATIENT)
Dept: PRIMARY CARE | Facility: CLINIC | Age: 70
End: 2025-05-28
Payer: MEDICARE

## 2025-07-23 ENCOUNTER — APPOINTMENT (OUTPATIENT)
Dept: PRIMARY CARE | Facility: CLINIC | Age: 70
End: 2025-07-23
Payer: MEDICARE

## 2025-07-23 VITALS
DIASTOLIC BLOOD PRESSURE: 73 MMHG | TEMPERATURE: 97.6 F | OXYGEN SATURATION: 96 % | BODY MASS INDEX: 32.98 KG/M2 | WEIGHT: 257 LBS | RESPIRATION RATE: 12 BRPM | HEART RATE: 50 BPM | HEIGHT: 74 IN | SYSTOLIC BLOOD PRESSURE: 121 MMHG

## 2025-07-23 DIAGNOSIS — Z00.00 MEDICARE ANNUAL WELLNESS VISIT, SUBSEQUENT: ICD-10-CM

## 2025-07-23 DIAGNOSIS — E55.9 VITAMIN D DEFICIENCY: ICD-10-CM

## 2025-07-23 DIAGNOSIS — E78.5 DYSLIPIDEMIA: ICD-10-CM

## 2025-07-23 DIAGNOSIS — E66.812 OBESITY, CLASS II, BMI 35-39.9: ICD-10-CM

## 2025-07-23 DIAGNOSIS — E53.8 B12 DEFICIENCY: ICD-10-CM

## 2025-07-23 DIAGNOSIS — R97.20 ELEVATED PSA: ICD-10-CM

## 2025-07-23 DIAGNOSIS — N40.0 BENIGN PROSTATIC HYPERPLASIA, UNSPECIFIED WHETHER LOWER URINARY TRACT SYMPTOMS PRESENT: ICD-10-CM

## 2025-07-23 DIAGNOSIS — Z87.442 HISTORY OF KIDNEY STONES: ICD-10-CM

## 2025-07-23 DIAGNOSIS — Z00.00 ROUTINE GENERAL MEDICAL EXAMINATION AT HEALTH CARE FACILITY: ICD-10-CM

## 2025-07-23 DIAGNOSIS — R53.83 FATIGUE, UNSPECIFIED TYPE: Primary | ICD-10-CM

## 2025-07-23 PROCEDURE — 1160F RVW MEDS BY RX/DR IN RCRD: CPT | Performed by: FAMILY MEDICINE

## 2025-07-23 PROCEDURE — 3008F BODY MASS INDEX DOCD: CPT | Performed by: FAMILY MEDICINE

## 2025-07-23 PROCEDURE — 1123F ACP DISCUSS/DSCN MKR DOCD: CPT | Performed by: FAMILY MEDICINE

## 2025-07-23 PROCEDURE — G0439 PPPS, SUBSEQ VISIT: HCPCS | Performed by: FAMILY MEDICINE

## 2025-07-23 PROCEDURE — 1159F MED LIST DOCD IN RCRD: CPT | Performed by: FAMILY MEDICINE

## 2025-07-23 PROCEDURE — 1170F FXNL STATUS ASSESSED: CPT | Performed by: FAMILY MEDICINE

## 2025-07-23 ASSESSMENT — ACTIVITIES OF DAILY LIVING (ADL)
MANAGING_FINANCES: INDEPENDENT
DRESSING: INDEPENDENT
DOING_HOUSEWORK: INDEPENDENT
GROCERY_SHOPPING: INDEPENDENT
BATHING: INDEPENDENT
TAKING_MEDICATION: INDEPENDENT

## 2025-07-23 ASSESSMENT — ENCOUNTER SYMPTOMS
CARDIOVASCULAR NEGATIVE: 1
MUSCULOSKELETAL NEGATIVE: 1
RESPIRATORY NEGATIVE: 1
PSYCHIATRIC NEGATIVE: 1
NEUROLOGICAL NEGATIVE: 1
GASTROINTESTINAL NEGATIVE: 1

## 2025-07-23 ASSESSMENT — PATIENT HEALTH QUESTIONNAIRE - PHQ9
2. FEELING DOWN, DEPRESSED OR HOPELESS: NOT AT ALL
1. LITTLE INTEREST OR PLEASURE IN DOING THINGS: NOT AT ALL
SUM OF ALL RESPONSES TO PHQ9 QUESTIONS 1 AND 2: 0

## 2025-07-23 NOTE — ASSESSMENT & PLAN NOTE
Last 3 BMI readings:  BMI Readings from Last 3 Encounters:   07/23/25 33.00 kg/m²   11/20/24 35.27 kg/m²   12/13/23 33.90 kg/m²   Lost 17 lbs since last visit.    Ideal weight is BMI 25 or under.  Think of healthy lifestyle changes rather than a diet  Weight loss is 75% diet and 25% exercise   Think of daily plate that includes 50% vegetables and do not count peas, corn, white potatoes as a vegetable. 25% complex grains/starch, 25% protein/fat.  Ideal diet is predominately plant based - Vegetables and Fruit. Protein from non meat sources ideal (whole beans, chickpeas). If choosing meat source for protein - first choice is fish in omega-3 such as [place]. Next choice is skinless poultry and last choice and infrequent should be red meat.  Weight loss can be helped through mindful eating. There are apps that can be used to assist with keeping track of your food water and exercise, such as My fitness pal Can see nutritionist if preferred.   Losing 4-6 lbs a month is a sufficient means of gradually losing and maintaining weight loss (good healthy, long term weight loss).

## 2025-07-23 NOTE — PROGRESS NOTES
"Subjective   Reason for Visit: Trevor Robertson is an 70 y.o. male here for a Medicare Wellness visit.          He is doing very well with weight loss. He has started eating meals that focus on protein and vegetables that he really likes. He is also intermittently fasting. He has an carmenza on his phone to track his progress as well. He reports feeling better already and he has more energy. A big influence on him wanting to lose weight was that he started experiencing right leg swelling during the day. He states that waking up in the mornings everything would be normal, but the more he was up and moving around during the day his right leg only would swell and it was very concerning to him. He has noticed major improvement in this symptom as he has been losing weight.   He is in the process of trying to schedule a repeat colonoscopy because he received a phone call a few months ago telling him that he is due.  He does not take any prescription medications. He is on daily vitamin D and vitamin B12 supplements.        Patient Care Team:  Idris Olivia MD as PCP - General  Idris Olivia MD as PCP - [facility] Attributed Provider     Review of Systems   HENT: Negative.     Respiratory: Negative.     Cardiovascular: Negative.    Gastrointestinal: Negative.    Genitourinary: Negative.    Musculoskeletal: Negative.    Neurological: Negative.    Psychiatric/Behavioral: Negative.         Objective   Vitals:  /73 (BP Location: Right arm, Patient Position: Sitting, BP Cuff Size: Adult)   Pulse 50   Temp 36.4 °C (97.6 °F) (Temporal)   Resp 12   Ht 1.88 m (6' 2\")   Wt 117 kg (257 lb)   SpO2 96%   BMI 33.00 kg/m²       Physical Exam  Constitutional:       Appearance: Normal appearance. He is obese.   HENT:      Head: Normocephalic.      Right Ear: Tympanic membrane normal.      Left Ear: Tympanic membrane normal.      Nose: Nose normal.      Mouth/Throat:      Mouth: Mucous membranes are moist.      Pharynx: Oropharynx is " clear.     Eyes:      Conjunctiva/sclera: Conjunctivae normal.       Cardiovascular:      Rate and Rhythm: Normal rate and regular rhythm.      Pulses: Normal pulses.      Heart sounds: Normal heart sounds.   Pulmonary:      Effort: Pulmonary effort is normal.      Breath sounds: Normal breath sounds.   Abdominal:      General: Abdomen is flat. Bowel sounds are normal.      Palpations: Abdomen is soft.     Musculoskeletal:         General: Normal range of motion.      Cervical back: Normal range of motion.     Skin:     General: Skin is warm and dry.     Neurological:      General: No focal deficit present.      Mental Status: He is alert and oriented to person, place, and time.     Psychiatric:         Mood and Affect: Mood normal.         Behavior: Behavior normal.         Thought Content: Thought content normal.         Judgment: Judgment normal.         Assessment/Plan   Problem List Items Addressed This Visit       Dyslipidemia    Lab Results   Component Value Date    CHOL 198 11/15/2024    CHOL 198 10/25/2023    CHOL 198 10/26/2022     Lab Results   Component Value Date    HDL 34.7 11/15/2024    HDL 36.8 10/25/2023    HDL 34.5 (A) 10/26/2022     Lab Results   Component Value Date    LDLCALC 112 (H) 11/15/2024    LDLCALC 112 (H) 10/25/2023     Lab Results   Component Value Date    TRIG 258 (H) 11/15/2024    TRIG 245 (H) 10/25/2023    TRIG 222 (H) 10/26/2022     CT cardiac scoring 2/15/23 - Coronary artery calcium score of 27.  Continue healthy dietary and lifestyle changes.         Elevated PSA    Lab Results   Component Value Date    PSA 6.60 (H) 09/25/2024    PSA 6.77 (H) 10/25/2023    PSA 8.1 (H) 10/24/2022   Monitored by urologist Dr. Hernandez.    Summary of issue:  MR prostate 2/2023 - 137g gland, no concerning lesions. PSAd 6%     CTU 2/2023 - nonobstructive stones bilaterally, enlarged prostate, bladder stone  Cyto 2/2023 - neg  9/13/24 renal US: Large prostate, no hydro or stones.      09/27/24 - PVR 0cc.  Denies any flank pain, no voiding issues or concerns. Does not take any medication for urination, only supplements. Erections work well.          Obesity, Class II, BMI 35-39.9    Last 3 BMI readings:  BMI Readings from Last 3 Encounters:   07/23/25 33.00 kg/m²   11/20/24 35.27 kg/m²   12/13/23 33.90 kg/m²   Lost 17 lbs since last visit.    Ideal weight is BMI 25 or under.  Think of healthy lifestyle changes rather than a diet  Weight loss is 75% diet and 25% exercise   Think of daily plate that includes 50% vegetables and do not count peas, corn, white potatoes as a vegetable. 25% complex grains/starch, 25% protein/fat.  Ideal diet is predominately plant based - Vegetables and Fruit. Protein from non meat sources ideal (whole beans, chickpeas). If choosing meat source for protein - first choice is fish in omega-3 such as [place]. Next choice is skinless poultry and last choice and infrequent should be red meat.  Weight loss can be helped through mindful eating. There are apps that can be used to assist with keeping track of your food water and exercise, such as My fitness pal Can see nutritionist if preferred.   Losing 4-6 lbs a month is a sufficient means of gradually losing and maintaining weight loss (good healthy, long term weight loss).          Medicare annual wellness visit, subsequent    History of kidney stones    8/19/2024 Renal US:  IMPRESSION:  1. No nephrolithiasis or hydronephrosis.  2. Prostatomegaly measuring 136 mL.  3. Incidental note made of hepatic steatosis. Advise clinical and  biochemical correlation.         Benign prostatic hyperplasia    9/27/2024 Dr. Hernandez note:  Reviewed and discussed his renal US 9/13, neg for stones or hydro. He is doing well, denies any flank pain or voiding concerns. Does not take any medication, only supplements. We will continue to monitor conservatively   Discussed his PSA, stable and reassuring.      Will follow up in 1 year with PVR and PSA prior.          Other  Visit Diagnoses         Fatigue, unspecified type    -  Primary      Vitamin D deficiency        Relevant Orders    Vitamin D 25-Hydroxy,Total (for eval of Vitamin D levels) (Completed)      B12 deficiency        Relevant Orders    Vitamin B12 (Completed)      Routine general medical examination at health care facility        Relevant Orders    1 Year Follow Up In [facility] - PCP - Wellness Exam          Follow up: Scheduled 7/29/2026    Scribe Attestation  By signing my name below, I, Mariano Chavez   attest that this documentation has been prepared under the direction and in the presence of Idris Olivia MD.

## 2025-07-24 ENCOUNTER — RESULTS FOLLOW-UP (OUTPATIENT)
Dept: PRIMARY CARE | Facility: CLINIC | Age: 70
End: 2025-07-24
Payer: MEDICARE

## 2025-07-24 LAB
25(OH)D3+25(OH)D2 SERPL-MCNC: 50 NG/ML (ref 30–100)
VIT B12 SERPL-MCNC: 337 PG/ML (ref 200–1100)

## 2025-08-12 DIAGNOSIS — E78.5 DYSLIPIDEMIA: ICD-10-CM

## 2025-08-12 DIAGNOSIS — E66.812 OBESITY, CLASS II, BMI 35-39.9: Primary | ICD-10-CM

## 2025-08-12 DIAGNOSIS — E55.9 VITAMIN D DEFICIENCY: ICD-10-CM

## 2025-09-26 ENCOUNTER — APPOINTMENT (OUTPATIENT)
Dept: UROLOGY | Facility: HOSPITAL | Age: 70
End: 2025-09-26
Payer: MEDICARE

## 2025-11-12 ENCOUNTER — APPOINTMENT (OUTPATIENT)
Dept: PRIMARY CARE | Facility: CLINIC | Age: 70
End: 2025-11-12
Payer: MEDICARE

## 2026-07-29 ENCOUNTER — APPOINTMENT (OUTPATIENT)
Dept: PRIMARY CARE | Facility: CLINIC | Age: 71
End: 2026-07-29
Payer: MEDICARE